# Patient Record
Sex: MALE | URBAN - METROPOLITAN AREA
[De-identification: names, ages, dates, MRNs, and addresses within clinical notes are randomized per-mention and may not be internally consistent; named-entity substitution may affect disease eponyms.]

---

## 2024-10-21 ENCOUNTER — LAB OUTSIDE AN ENCOUNTER (OUTPATIENT)
Dept: URBAN - METROPOLITAN AREA CLINIC 118 | Facility: CLINIC | Age: 84
End: 2024-10-21

## 2024-10-21 ENCOUNTER — DASHBOARD ENCOUNTERS (OUTPATIENT)
Age: 84
End: 2024-10-21

## 2024-10-21 ENCOUNTER — OFFICE VISIT (OUTPATIENT)
Dept: URBAN - METROPOLITAN AREA CLINIC 118 | Facility: CLINIC | Age: 84
End: 2024-10-21
Payer: MEDICARE

## 2024-10-21 VITALS
HEART RATE: 76 BPM | BODY MASS INDEX: 29.62 KG/M2 | WEIGHT: 200 LBS | DIASTOLIC BLOOD PRESSURE: 40 MMHG | TEMPERATURE: 98.6 F | SYSTOLIC BLOOD PRESSURE: 84 MMHG | HEIGHT: 69 IN

## 2024-10-21 DIAGNOSIS — R15.9 FULL INCONTINENCE OF FECES: ICD-10-CM

## 2024-10-21 DIAGNOSIS — K59.09 CHRONIC CONSTIPATION WITH OVERFLOW INCONTINENCE: ICD-10-CM

## 2024-10-21 DIAGNOSIS — R15.2 FECAL URGENCY: ICD-10-CM

## 2024-10-21 PROBLEM — 71820002: Status: ACTIVE | Noted: 2024-10-21

## 2024-10-21 PROBLEM — 142251000119102: Status: ACTIVE | Noted: 2024-10-21

## 2024-10-21 PROBLEM — 31499008: Status: ACTIVE | Noted: 2024-10-21

## 2024-10-21 PROCEDURE — 99204 OFFICE O/P NEW MOD 45 MIN: CPT | Performed by: STUDENT IN AN ORGANIZED HEALTH CARE EDUCATION/TRAINING PROGRAM

## 2024-10-21 RX ORDER — ATORVASTATIN CALCIUM 80 MG/1
TABLET ORAL
Qty: 90 TABLET | Status: ACTIVE | COMMUNITY

## 2024-10-21 RX ORDER — POTASSIUM CHLORIDE 1500 MG/1
TAKE 1 TABLET BY MOUTH EVERY DAY TABLET, FILM COATED, EXTENDED RELEASE ORAL
Qty: 90 EACH | Refills: 0 | Status: ACTIVE | COMMUNITY

## 2024-10-21 RX ORDER — METOPROLOL SUCCINATE 25 MG/1
TAKE 1 TABLET BY MOUTH DAILY TABLET, FILM COATED, EXTENDED RELEASE ORAL
Qty: 90 EACH | Refills: 1 | Status: ACTIVE | COMMUNITY

## 2024-10-21 RX ORDER — LORATADINE 10 MG
1 PACKET MIXED WITH 8 OUNCES OF FLUID TABLET,DISINTEGRATING ORAL ONCE A DAY
Qty: 30 | OUTPATIENT
Start: 2024-10-21 | End: 2024-11-19

## 2024-10-21 RX ORDER — TAMSULOSIN HYDROCHLORIDE 0.4 MG/1
CAPSULE ORAL
Qty: 90 CAPSULE | Status: ACTIVE | COMMUNITY

## 2024-10-21 RX ORDER — SEVELAMER CARBONATE 800 MG/1
TABLET, FILM COATED ORAL
Qty: 240 TABLET | Status: ACTIVE | COMMUNITY

## 2024-10-21 RX ORDER — LEVOTHYROXINE SODIUM 0.1 MG/1
TABLET ORAL
Qty: 90 TABLET | Status: ACTIVE | COMMUNITY

## 2024-10-21 RX ORDER — CALCIUM ACETATE 667 MG/1
CAPSULE ORAL
Qty: 360 CAPSULE | Status: ACTIVE | COMMUNITY

## 2024-10-21 RX ORDER — FUROSEMIDE 80 MG/1
TAKE 1 TABLET BY MOUTH EVERY MORNING TABLET ORAL
Qty: 90 EACH | Refills: 0 | Status: ACTIVE | COMMUNITY

## 2024-10-21 RX ORDER — HYDROCODONE BITARTRATE AND ACETAMINOPHEN 7.5; 325 MG/1; MG/1
TAKE 1 TABLET BY MOUTH EVERY 8 HOURS AS NEEDED FOR PAIN FOR 30 DAYS TABLET ORAL
Qty: 90 EACH | Refills: 0 | Status: ACTIVE | COMMUNITY

## 2024-10-21 RX ORDER — BUSPIRONE HYDROCHLORIDE 10 MG/1
TABLET ORAL
Qty: 90 TABLET | Status: ACTIVE | COMMUNITY

## 2024-10-21 NOTE — HPI-TODAY'S VISIT:
The patient is a 84-year-old male with history of CAD s/p CABG, ESRD on PD, dementia, who presents with his son for evaluation.  Indication for the referral is unclear; available labs show hemoglobin of 10.9, normal MCV. and ferritin 990; per the patient's son, he has been having worsening issues with bladder and bowel incontinence.  The patient son is not sure when his last colonoscopy was or if any of his colonoscopies have been abnormal (he mentions his wife usually comes to these appointments).  The patient does peritoneal dialysis and has been living with his son for the past 3 to 4 years.  He notes that incontinence has been worsening and is generally loose, despite the fact that he takes several constipating medicines.  The patient's son is not clear if he has had cross-sectional imaging in the past 5 years.  The patient states that his bowel movements are normal, however it is unclear how reliable of a historian he is.